# Patient Record
Sex: FEMALE | Race: WHITE | URBAN - METROPOLITAN AREA
[De-identification: names, ages, dates, MRNs, and addresses within clinical notes are randomized per-mention and may not be internally consistent; named-entity substitution may affect disease eponyms.]

---

## 2018-04-30 ENCOUNTER — APPOINTMENT (RX ONLY)
Dept: URBAN - METROPOLITAN AREA CLINIC 128 | Facility: CLINIC | Age: 63
Setting detail: DERMATOLOGY
End: 2018-04-30

## 2018-04-30 DIAGNOSIS — D22 MELANOCYTIC NEVI: ICD-10-CM

## 2018-04-30 DIAGNOSIS — L71.8 OTHER ROSACEA: ICD-10-CM

## 2018-04-30 DIAGNOSIS — L82.1 OTHER SEBORRHEIC KERATOSIS: ICD-10-CM

## 2018-04-30 PROBLEM — D22.72 MELANOCYTIC NEVI OF LEFT LOWER LIMB, INCLUDING HIP: Status: ACTIVE | Noted: 2018-04-30

## 2018-04-30 PROCEDURE — ? TREATMENT REGIMEN

## 2018-04-30 PROCEDURE — ? OBSERVATION AND MEASURE

## 2018-04-30 PROCEDURE — ? PRESCRIPTION

## 2018-04-30 PROCEDURE — 99213 OFFICE O/P EST LOW 20 MIN: CPT

## 2018-04-30 RX ORDER — IVERMECTIN 10 MG/G
CREAM TOPICAL
Qty: 1 | Refills: 5 | Status: ERX

## 2018-04-30 ASSESSMENT — LOCATION ZONE DERM
LOCATION ZONE: LEG
LOCATION ZONE: TRUNK
LOCATION ZONE: FACE

## 2018-04-30 ASSESSMENT — LOCATION SIMPLE DESCRIPTION DERM
LOCATION SIMPLE: LEFT PRETIBIAL REGION
LOCATION SIMPLE: UPPER BACK
LOCATION SIMPLE: RIGHT CHEEK

## 2018-04-30 ASSESSMENT — LOCATION DETAILED DESCRIPTION DERM
LOCATION DETAILED: LEFT PROXIMAL PRETIBIAL REGION
LOCATION DETAILED: INFERIOR THORACIC SPINE
LOCATION DETAILED: RIGHT INFERIOR CENTRAL MALAR CHEEK

## 2018-04-30 ASSESSMENT — SEVERITY ASSESSMENT OVERALL AMONG ALL PATIENTS
IN YOUR EXPERIENCE, AMONG ALL PATIENTS YOU HAVE SEEN WITH THIS CONDITION, HOW SEVERE IS THIS PATIENT'S CONDITION?: MILD TO MODERATE

## 2019-07-03 ENCOUNTER — APPOINTMENT (RX ONLY)
Dept: URBAN - METROPOLITAN AREA CLINIC 118 | Facility: CLINIC | Age: 64
Setting detail: DERMATOLOGY
End: 2019-07-03

## 2019-07-03 ENCOUNTER — RX ONLY (OUTPATIENT)
Age: 64
Setting detail: RX ONLY
End: 2019-07-03

## 2019-07-03 DIAGNOSIS — L71.8 OTHER ROSACEA: ICD-10-CM

## 2019-07-03 DIAGNOSIS — D485 NEOPLASM OF UNCERTAIN BEHAVIOR OF SKIN: ICD-10-CM

## 2019-07-03 PROBLEM — D48.5 NEOPLASM OF UNCERTAIN BEHAVIOR OF SKIN: Status: ACTIVE | Noted: 2019-07-03

## 2019-07-03 PROCEDURE — ? PRESCRIPTION

## 2019-07-03 PROCEDURE — ? COUNSELING

## 2019-07-03 PROCEDURE — 11102 TANGNTL BX SKIN SINGLE LES: CPT

## 2019-07-03 PROCEDURE — ? TREATMENT REGIMEN

## 2019-07-03 PROCEDURE — 99213 OFFICE O/P EST LOW 20 MIN: CPT | Mod: 25

## 2019-07-03 PROCEDURE — ? BIOPSY BY SHAVE METHOD

## 2019-07-03 RX ORDER — DOXYCYCLINE HYCLATE 50 MG/1
TABLET, FILM COATED ORAL
Qty: 60 | Refills: 3 | Status: ERX | COMMUNITY
Start: 2019-07-03

## 2019-07-03 RX ORDER — IVERMECTIN 10 MG/G
CREAM TOPICAL
Qty: 1 | Refills: 3 | Status: ERX | COMMUNITY
Start: 2019-07-03

## 2019-07-03 RX ADMIN — DOXYCYCLINE HYCLATE: 50 TABLET, FILM COATED ORAL at 00:00

## 2019-07-03 ASSESSMENT — LOCATION ZONE DERM
LOCATION ZONE: NOSE
LOCATION ZONE: NECK
LOCATION ZONE: FACE

## 2019-07-03 ASSESSMENT — LOCATION SIMPLE DESCRIPTION DERM
LOCATION SIMPLE: LEFT CHEEK
LOCATION SIMPLE: NOSE
LOCATION SIMPLE: LEFT ANTERIOR NECK
LOCATION SIMPLE: RIGHT CHEEK

## 2019-07-03 ASSESSMENT — LOCATION DETAILED DESCRIPTION DERM
LOCATION DETAILED: LEFT INFERIOR CENTRAL MALAR CHEEK
LOCATION DETAILED: NASAL TIP
LOCATION DETAILED: LEFT CLAVICULAR NECK
LOCATION DETAILED: RIGHT MEDIAL MALAR CHEEK

## 2019-07-03 NOTE — HPI: SECONDARY COMPLAINT
How Severe Is This Condition?: mild
Additional History: Pt stated she has a black spot on left shoulder, area present for years, but started getting irritated within a month, small part fell off last night. No H/O skin cancer.

## 2019-07-03 NOTE — HPI: RASH
What Type Of Note Output Would You Prefer (Optional)?: Bullet Format
How Severe Is Your Rash?: mild
Is This A New Presentation, Or A Follow-Up?: Rash
Additional History: F/U rosacea- flared on nose, Right and Left cheeks. Apply’s Soolantra QD- does not help.

## 2019-07-03 NOTE — PROCEDURE: BIOPSY BY SHAVE METHOD
Type Of Destruction Used: Curettage
Consent: Written consent was obtained and risks were reviewed including but not limited to scarring, infection, bleeding, scabbing, incomplete removal, nerve damage and allergy to anesthesia.
Electrodesiccation And Curettage Text: The wound bed was treated with electrodesiccation and curettage after the biopsy was performed.
Anesthesia Volume In Cc (Will Not Render If 0): 0.5
X Size Of Lesion In Cm: 0
Biopsy Type: H and E
Silver Nitrate Text: The wound bed was treated with silver nitrate after the biopsy was performed.
Bill [id number] For Specimen Handling/Conveyance To Laboratory?: no
Detail Level: Detailed
Biopsy Method: Dermablade
Hemostasis: Drysol
Dressing: bandage
Was A Bandage Applied: Yes
Anesthesia Type: 1% lidocaine with epinephrine
Curettage Text: The wound bed was treated with curettage after the biopsy was performed.
Lab: [id number]
Billing Type: Third-Party Bill
Wound Care: Petrolatum
Depth Of Biopsy: dermis
Electrodesiccation Text: The wound bed was treated with electrodesiccation after the biopsy was performed.
Notification Instructions: Patient will be notified of biopsy results. However, patient instructed to call the office if not contacted within 2 weeks.
Cryotherapy Text: The wound bed was treated with cryotherapy after the biopsy was performed.
Post-Care Instructions: I reviewed with the patient in detail post-care instructions. Patient is to keep the biopsy site dry overnight, and then apply bacitracin twice daily until healed. Patient may apply hydrogen peroxide soaks to remove any crusting.
Lab Facility: [id number]

## 2020-08-06 ENCOUNTER — APPOINTMENT (RX ONLY)
Dept: URBAN - METROPOLITAN AREA CLINIC 118 | Facility: CLINIC | Age: 65
Setting detail: DERMATOLOGY
End: 2020-08-06

## 2020-08-06 DIAGNOSIS — L71.8 OTHER ROSACEA: ICD-10-CM

## 2020-08-06 DIAGNOSIS — L91.8 OTHER HYPERTROPHIC DISORDERS OF THE SKIN: ICD-10-CM

## 2020-08-06 DIAGNOSIS — L82.1 OTHER SEBORRHEIC KERATOSIS: ICD-10-CM

## 2020-08-06 PROCEDURE — ? TREATMENT REGIMEN

## 2020-08-06 PROCEDURE — ? PRESCRIPTION

## 2020-08-06 PROCEDURE — 99214 OFFICE O/P EST MOD 30 MIN: CPT

## 2020-08-06 RX ORDER — IVERMECTIN 10 MG/G
CREAM TOPICAL
Qty: 1 | Refills: 8 | Status: ERX

## 2020-08-06 ASSESSMENT — LOCATION DETAILED DESCRIPTION DERM
LOCATION DETAILED: LEFT CENTRAL MALAR CHEEK
LOCATION DETAILED: RIGHT LATERAL ABDOMEN
LOCATION DETAILED: LEFT CENTRAL LATERAL NECK

## 2020-08-06 ASSESSMENT — LOCATION SIMPLE DESCRIPTION DERM
LOCATION SIMPLE: ABDOMEN
LOCATION SIMPLE: LEFT CHEEK
LOCATION SIMPLE: NECK

## 2020-08-06 ASSESSMENT — LOCATION ZONE DERM
LOCATION ZONE: FACE
LOCATION ZONE: NECK
LOCATION ZONE: TRUNK